# Patient Record
Sex: FEMALE | URBAN - METROPOLITAN AREA
[De-identification: names, ages, dates, MRNs, and addresses within clinical notes are randomized per-mention and may not be internally consistent; named-entity substitution may affect disease eponyms.]

---

## 2023-12-21 ENCOUNTER — TELEPHONE (OUTPATIENT)
Dept: TRANSPLANT | Facility: CLINIC | Age: 78
End: 2023-12-21

## 2023-12-21 NOTE — TELEPHONE ENCOUNTER
----- Message from [employer] F[employer] sent at 12/21/2023  7:37 AM CST -----    Received medical recs on pt, but missing info. Request submitted via [employer] for: LABS AND IMG REPORTS      Terra Ford PA  Phone: [phone]  Fax: [phone].

## 2024-01-04 ENCOUNTER — TELEPHONE (OUTPATIENT)
Dept: TRANSPLANT | Facility: CLINIC | Age: 79
End: 2024-01-04

## 2024-01-04 ENCOUNTER — TELEPHONE (OUTPATIENT)
Dept: TRANSPLANT | Facility: CLINIC | Age: 79
End: 2024-01-04
Payer: MEDICARE

## 2024-01-04 NOTE — TELEPHONE ENCOUNTER
----- Message from Marco Antonio Aponte sent at 1/4/2024  9:47 AM CST -----    Hepatology referral received and scanned into media; pt chart sent to referral nurse for medical review.       Referring Provider: Ayaz Viramontes MD  Phone: [phone]  Fax: [phone]  .

## 2024-01-04 NOTE — LETTER
January 4, 2024    Claudette Bruno  [address] [facility] [address]      Dear Claudette Bruno:    Your doctor has referred you to the [facility]. We are sending this letter to remind you to make an appointment with us to complete the referral process.     Please call us at [phone] to schedule an appointment. We look forward to seeing you soon.     If you received a call and have been scheduled, please disregard this letter.       Sincerely,        [facility]   [facility] [place] [address]  [phone]

## 2024-01-04 NOTE — TELEPHONE ENCOUNTER
Pt records reviewed.  Pt will be referred to Hepatology due to cirrhosis referred to Dr Emery.   Initial referral received  from Referring Provider: Ayaz Viramontes MD   Phone: [phone]   Fax: [phone]   Referral letter sent to patient.  Insurance info sent to        RECORDS SCANNED IN MEDIA UNDER HEPATOLOGY REFERRAL .

## 2024-01-04 NOTE — TELEPHONE ENCOUNTER
Called the patient to schedule a hepatology consult from referral.  No answer, left voicemail with call back # [phone].

## 2024-01-04 NOTE — TELEPHONE ENCOUNTER
----- Message from Marco Antonio Aponte sent at 1/4/2024  9:47 AM CST -----    Hepatology referral received and scanned into media; pt chart sent to referral nurse for medical review.       Referring Provider: Ayaz Viramontes MD  Phone: [phone]  Fax: [phone]  .

## 2024-01-10 NOTE — TELEPHONE ENCOUNTER
Called the patient to schedule a hepatology consult from referral.  Spoke with the daughter, she stated that her mom is released from the hospital to the hospice at home.  She is going to give us a call back if they still need a consultation. She wants to discuss it first with the family.  Left call back # [phone].